# Patient Record
Sex: FEMALE | Race: WHITE
[De-identification: names, ages, dates, MRNs, and addresses within clinical notes are randomized per-mention and may not be internally consistent; named-entity substitution may affect disease eponyms.]

---

## 2019-12-18 ENCOUNTER — HOSPITAL ENCOUNTER (OUTPATIENT)
Dept: HOSPITAL 95 - ORSCMMR | Age: 54
Discharge: HOME | End: 2019-12-18
Attending: INTERNAL MEDICINE
Payer: COMMERCIAL

## 2019-12-18 VITALS — BODY MASS INDEX: 45.27 KG/M2 | WEIGHT: 249.12 LBS | HEIGHT: 62.01 IN

## 2019-12-18 DIAGNOSIS — D12.3: ICD-10-CM

## 2019-12-18 DIAGNOSIS — E66.01: ICD-10-CM

## 2019-12-18 DIAGNOSIS — K57.30: ICD-10-CM

## 2019-12-18 DIAGNOSIS — K21.9: ICD-10-CM

## 2019-12-18 DIAGNOSIS — I10: ICD-10-CM

## 2019-12-18 DIAGNOSIS — R19.5: Primary | ICD-10-CM

## 2019-12-18 DIAGNOSIS — Z79.899: ICD-10-CM

## 2019-12-18 DIAGNOSIS — D12.5: ICD-10-CM

## 2019-12-18 DIAGNOSIS — F17.210: ICD-10-CM

## 2019-12-18 DIAGNOSIS — D12.4: ICD-10-CM

## 2019-12-18 DIAGNOSIS — J44.9: ICD-10-CM

## 2019-12-18 DIAGNOSIS — K63.5: ICD-10-CM

## 2019-12-18 PROCEDURE — 0DBN8ZX EXCISION OF SIGMOID COLON, VIA NATURAL OR ARTIFICIAL OPENING ENDOSCOPIC, DIAGNOSTIC: ICD-10-PCS | Performed by: INTERNAL MEDICINE

## 2019-12-18 PROCEDURE — 0DBL8ZX EXCISION OF TRANSVERSE COLON, VIA NATURAL OR ARTIFICIAL OPENING ENDOSCOPIC, DIAGNOSTIC: ICD-10-PCS | Performed by: INTERNAL MEDICINE

## 2019-12-18 PROCEDURE — 0DBE8ZX EXCISION OF LARGE INTESTINE, VIA NATURAL OR ARTIFICIAL OPENING ENDOSCOPIC, DIAGNOSTIC: ICD-10-PCS | Performed by: INTERNAL MEDICINE

## 2019-12-18 PROCEDURE — 0DBM8ZX EXCISION OF DESCENDING COLON, VIA NATURAL OR ARTIFICIAL OPENING ENDOSCOPIC, DIAGNOSTIC: ICD-10-PCS | Performed by: INTERNAL MEDICINE

## 2019-12-18 NOTE — NUR
Ambulatory in Day Surgery. Patient states colon prep results clear.
History, Chart, Medications and Allergies reviewed before start of
procedure. Lungs clear T/O to Auscultation.
Pre-Op teaching done. Pt verbalizes understanding.
Patient States Post-Procedure ride home has been arranged [facility].

## 2019-12-18 NOTE — NUR
Patient up to Ambulate independently. Gait steady.  Discharge instructions
reviewed with patient. Patient verbalizes understanding.  Discharge
instructions reviewed with patient. Patient verbalizes understanding.  Copy
given to patient to take home.  Patient States Post-Procedure ride home has
been arranged.  Discharged via wheelchair to private car for ride home.

## 2019-12-18 NOTE — NUR
12/18/19 1043 KASSIE COSBY
History, Chart, Medications and Allergies reviewed before start of
procedure.3-LEAD EKG REVIEWED WITH PHYSICIAN PRIOR TO START OF
PROCEDURE.O2 VIA N/C INTACT THROUGHOUT SEDATION/PROCEDURE. MONITOR
INTACT WITH CONTINUOUS PULSE OXIMETRY AND INTERMITTENT BP.

## 2022-12-19 ENCOUNTER — HOSPITAL ENCOUNTER (OUTPATIENT)
Dept: HOSPITAL 95 - MHTC | Age: 57
Discharge: HOME | End: 2022-12-19
Attending: INTERNAL MEDICINE
Payer: COMMERCIAL

## 2022-12-19 VITALS — HEIGHT: 62 IN | WEIGHT: 257.94 LBS | BODY MASS INDEX: 47.47 KG/M2

## 2022-12-19 DIAGNOSIS — I10: ICD-10-CM

## 2022-12-19 DIAGNOSIS — F17.200: ICD-10-CM

## 2022-12-19 DIAGNOSIS — J44.9: ICD-10-CM

## 2022-12-19 DIAGNOSIS — Z01.810: Primary | ICD-10-CM

## 2022-12-19 DIAGNOSIS — Z91.010: ICD-10-CM

## 2022-12-19 PROCEDURE — C1894 INTRO/SHEATH, NON-LASER: HCPCS

## 2022-12-19 PROCEDURE — C1887 CATHETER, GUIDING: HCPCS

## 2022-12-19 PROCEDURE — C1769 GUIDE WIRE: HCPCS

## 2022-12-19 PROCEDURE — A9270 NON-COVERED ITEM OR SERVICE: HCPCS

## 2022-12-19 NOTE — NUR
PT DRESSED SELF WITHOUT ISSUE, SITE UNCHANGED.  TR BAND REMOVED, CLOTH DOT AND
WRIST IMMOBILIZER PLACED; IV REMOVED-CANNULA INTACT.

## 2022-12-19 NOTE — NUR
PT AND FAMILY MEMBER RECEIVED DISCHARGE INSTRUCTIONS, MED LIST AND AFTER CARE
INSTRUCTIONS; VERBALIZED GOOD UNDERSTANDING.  PT LEFT FACILITY VIA W/C,
CONDITION STABLE.

## 2022-12-19 NOTE — NUR
ASSUMED CARE OF PT.  PT AWAKE AND CONVERSING APPROPRIATELY; DENIES CHEST PAIN
POST PROCEDURE.  MONITOR SR 70'S, B/P 123/78, SPO2 97% RA.  R RADIAL SITE NO
SWELLING/HEMATOMA, TR BAND IN PLACE; RUE POSITIVE PLEUTH, 2 CC AIR
REMOVED-SITE UNCHANGED.

## 2025-01-23 ENCOUNTER — HOSPITAL ENCOUNTER (OUTPATIENT)
Dept: HOSPITAL 95 - ORSCMMR | Age: 60
Discharge: HOME | End: 2025-01-23
Attending: INTERNAL MEDICINE
Payer: COMMERCIAL

## 2025-01-23 VITALS — DIASTOLIC BLOOD PRESSURE: 92 MMHG | SYSTOLIC BLOOD PRESSURE: 144 MMHG

## 2025-01-23 VITALS — DIASTOLIC BLOOD PRESSURE: 74 MMHG | SYSTOLIC BLOOD PRESSURE: 111 MMHG

## 2025-01-23 VITALS — BODY MASS INDEX: 43.45 KG/M2 | WEIGHT: 236.12 LBS | HEIGHT: 62 IN

## 2025-01-23 DIAGNOSIS — R63.4: ICD-10-CM

## 2025-01-23 DIAGNOSIS — R10.9: ICD-10-CM

## 2025-01-23 DIAGNOSIS — K21.9: Primary | ICD-10-CM

## 2025-01-23 DIAGNOSIS — Z87.891: ICD-10-CM

## 2025-01-23 DIAGNOSIS — Z79.899: ICD-10-CM

## 2025-01-23 DIAGNOSIS — G47.33: ICD-10-CM

## 2025-01-23 DIAGNOSIS — D12.4: ICD-10-CM

## 2025-01-23 DIAGNOSIS — J45.909: ICD-10-CM

## 2025-01-23 DIAGNOSIS — Z79.82: ICD-10-CM

## 2025-01-23 DIAGNOSIS — K57.30: ICD-10-CM

## 2025-01-23 DIAGNOSIS — J44.9: ICD-10-CM

## 2025-01-23 DIAGNOSIS — N18.9: ICD-10-CM

## 2025-01-23 DIAGNOSIS — D12.3: ICD-10-CM

## 2025-01-23 DIAGNOSIS — E66.01: ICD-10-CM

## 2025-01-23 DIAGNOSIS — I12.9: ICD-10-CM

## 2025-01-23 PROCEDURE — 0DBN8ZX EXCISION OF SIGMOID COLON, VIA NATURAL OR ARTIFICIAL OPENING ENDOSCOPIC, DIAGNOSTIC: ICD-10-PCS | Performed by: INTERNAL MEDICINE

## 2025-01-23 PROCEDURE — 0DB48ZX EXCISION OF ESOPHAGOGASTRIC JUNCTION, VIA NATURAL OR ARTIFICIAL OPENING ENDOSCOPIC, DIAGNOSTIC: ICD-10-PCS | Performed by: INTERNAL MEDICINE

## 2025-01-23 PROCEDURE — 0DB98ZX EXCISION OF DUODENUM, VIA NATURAL OR ARTIFICIAL OPENING ENDOSCOPIC, DIAGNOSTIC: ICD-10-PCS | Performed by: INTERNAL MEDICINE

## 2025-01-23 PROCEDURE — 0DB78ZX EXCISION OF STOMACH, PYLORUS, VIA NATURAL OR ARTIFICIAL OPENING ENDOSCOPIC, DIAGNOSTIC: ICD-10-PCS | Performed by: INTERNAL MEDICINE

## 2025-01-23 PROCEDURE — 0DBM8ZX EXCISION OF DESCENDING COLON, VIA NATURAL OR ARTIFICIAL OPENING ENDOSCOPIC, DIAGNOSTIC: ICD-10-PCS | Performed by: INTERNAL MEDICINE

## 2025-01-23 PROCEDURE — 0DBL8ZX EXCISION OF TRANSVERSE COLON, VIA NATURAL OR ARTIFICIAL OPENING ENDOSCOPIC, DIAGNOSTIC: ICD-10-PCS | Performed by: INTERNAL MEDICINE

## 2025-01-23 NOTE — NUR
01/23/25 0833 Elizabeth Smith
History, Chart, Medications and Allergies reviewed before start of
procedure.MONITOR INTACT WITH CONTINUOUS PULSE OXIMETRY, CONTINUOUS
END TITAL CO2, AND INTERMITTENT BLOOD PRESSURE.3-LEAD EKG REVIEWED
WITH PHYSICIAN PRIOR TO START OF PROCEDURE.O2 VIA POM INTACT
THROUGHOUT SEDATION/PROCEDURE.See Anesthesia record.
Ambulatory in Day Surgery
History, Chart, Medications and Allergies reviewed before start of
procedure.
Pre-Op teaching done. Pt verbalizes understanding.
Patient States Post-Procedure ride home has been arranged.
Discharge instructions reviewed with patient. Patient verbalizes
understanding.  Copy given to patient to take home.  Patient States
Post-Procedure ride home has been arranged.  Discharged via wheelchair to
private car for ride home.
impaired

## 2025-03-07 ENCOUNTER — HOSPITAL ENCOUNTER (OUTPATIENT)
Dept: HOSPITAL 95 - ORSCSDS | Age: 60
Discharge: HOME | End: 2025-03-07
Attending: PODIATRIST
Payer: COMMERCIAL

## 2025-03-07 VITALS — HEIGHT: 62 IN | BODY MASS INDEX: 43.41 KG/M2 | WEIGHT: 235.89 LBS

## 2025-03-07 VITALS — SYSTOLIC BLOOD PRESSURE: 113 MMHG | DIASTOLIC BLOOD PRESSURE: 75 MMHG

## 2025-03-07 DIAGNOSIS — M62.462: ICD-10-CM

## 2025-03-07 DIAGNOSIS — I12.9: ICD-10-CM

## 2025-03-07 DIAGNOSIS — Z87.891: ICD-10-CM

## 2025-03-07 DIAGNOSIS — Z79.899: ICD-10-CM

## 2025-03-07 DIAGNOSIS — J44.89: ICD-10-CM

## 2025-03-07 DIAGNOSIS — G47.33: ICD-10-CM

## 2025-03-07 DIAGNOSIS — E66.01: ICD-10-CM

## 2025-03-07 DIAGNOSIS — M76.62: Primary | ICD-10-CM

## 2025-03-07 PROCEDURE — A9270 NON-COVERED ITEM OR SERVICE: HCPCS

## 2025-03-07 PROCEDURE — C1713 ANCHOR/SCREW BN/BN,TIS/BN: HCPCS

## 2025-03-07 NOTE — NUR
03/07/25 1043 Praveen Banks
BLOCK: TIME OUT WITH DR LIN AT 1028. POPATEAL BLOCK PERFORMED BY DR LIN, PT TOLERATED WELL. BLOCK FINISHED AT 1040.